# Patient Record
Sex: FEMALE | Race: OTHER
[De-identification: names, ages, dates, MRNs, and addresses within clinical notes are randomized per-mention and may not be internally consistent; named-entity substitution may affect disease eponyms.]

---

## 2021-08-27 ENCOUNTER — HOSPITAL ENCOUNTER (EMERGENCY)
Dept: HOSPITAL 8 - ED | Age: 66
Discharge: HOME | End: 2021-08-27
Payer: COMMERCIAL

## 2021-08-27 VITALS — SYSTOLIC BLOOD PRESSURE: 125 MMHG | DIASTOLIC BLOOD PRESSURE: 56 MMHG

## 2021-08-27 VITALS — WEIGHT: 132.5 LBS | HEIGHT: 65 IN | BODY MASS INDEX: 22.08 KG/M2

## 2021-08-27 DIAGNOSIS — L03.211: ICD-10-CM

## 2021-08-27 DIAGNOSIS — H10.31: Primary | ICD-10-CM

## 2021-08-27 PROCEDURE — 99283 EMERGENCY DEPT VISIT LOW MDM: CPT

## 2021-08-27 NOTE — NUR
ASSUMED CARE OF PT AT THIS TIME FROM TRIAGE. 65 Y/O A&OX4 F PRESENTS STATING 
"RIGHT EYE INFECTION, STYE SUNDAY, DID TELEDOC APPT YESTERDAY MORNING, 
PRESCRIBED ERYTHROMYCIN, BUT I THINK IT MADE IT WORSE. NOT HAVNG TROUBLE SEEING 
AT ALL BUT MAYBE A LITTLE HAZY FROM THE EYE OINTMENT. BEEN DOING WARM SOAKS." 
PERIORBITAL EDEMA NOTED WITH EYE DISCHARGE, ERYTHEMA RIGHT UPPER LID. CONT 
PULSE OX, BP MONITORS APPLIED. VSS. CALL LIGHT IN REACH. FALL PRECAUTIONS IN 
PLACE. DR. BAILEY AT BEDSIDE FOR EVALUATION, NO VISUAL ACUITY TEST NEEDED PER 
ERP.